# Patient Record
Sex: FEMALE | URBAN - METROPOLITAN AREA
[De-identification: names, ages, dates, MRNs, and addresses within clinical notes are randomized per-mention and may not be internally consistent; named-entity substitution may affect disease eponyms.]

---

## 2020-12-02 ENCOUNTER — OFFICE VISIT (OUTPATIENT)
Dept: URBAN - METROPOLITAN AREA CLINIC 54 | Facility: CLINIC | Age: 60
End: 2020-12-02
Payer: COMMERCIAL

## 2020-12-02 DIAGNOSIS — H31.091 OTHER CHORIORETINAL SCARS, RIGHT EYE: ICD-10-CM

## 2020-12-02 DIAGNOSIS — H35.371 PUCKERING OF MACULA, RIGHT EYE: Primary | ICD-10-CM

## 2020-12-02 DIAGNOSIS — H43.811 VITREOUS DEGENERATION, RIGHT EYE: ICD-10-CM

## 2020-12-02 DIAGNOSIS — Z97.0 PRESENCE OF ARTIFICIAL EYE: ICD-10-CM

## 2020-12-02 PROCEDURE — 92134 CPTRZ OPH DX IMG PST SGM RTA: CPT | Performed by: OPHTHALMOLOGY

## 2020-12-02 PROCEDURE — 92014 COMPRE OPH EXAM EST PT 1/>: CPT | Performed by: OPHTHALMOLOGY

## 2020-12-02 ASSESSMENT — INTRAOCULAR PRESSURE: OD: 23

## 2020-12-02 NOTE — IMPRESSION/PLAN
Impression: Puckering of macula, right eye: H35.371. OD.
OCT OD= ST ERM with good foveal contour  Plan: There is an epiretinal membrane (ERM) in the right eye. We did discuss the natural history as well as the risks and benefits of observation vs. vitrectomy surgery. Given the good foveal contour, and the fact that she is monocular, would recommend observation at this time. The patient will call if they experience decreased vision or increased metamorphopsia. Monocular precautions reviewed. Use AT's. IOP borderline -- obs for now -- may need gtt in future 6 months, OCT/DFE OD (monocular)

## 2020-12-02 NOTE — IMPRESSION/PLAN
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
s/p laser x RT Plan: Stable. No new RT/RD. Observe.

## 2020-12-02 NOTE — IMPRESSION/PLAN
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 Plan: At this time, no retinal tear or retinal detachment is identified. Retinal detachment symptoms were reviewed. Patient was encouraged to call our office if there is an increase in floaters, decrease in vision, or a shadow or curtain is noted in their peripheral vision.

## 2021-06-16 ENCOUNTER — OFFICE VISIT (OUTPATIENT)
Dept: URBAN - METROPOLITAN AREA CLINIC 54 | Facility: CLINIC | Age: 61
End: 2021-06-16
Payer: COMMERCIAL

## 2021-06-16 DIAGNOSIS — H35.411 LATTICE DEGENERATION OF RETINA, RIGHT EYE: ICD-10-CM

## 2021-06-16 PROCEDURE — 99214 OFFICE O/P EST MOD 30 MIN: CPT | Performed by: OPHTHALMOLOGY

## 2021-06-16 PROCEDURE — 92134 CPTRZ OPH DX IMG PST SGM RTA: CPT | Performed by: OPHTHALMOLOGY

## 2021-06-16 ASSESSMENT — INTRAOCULAR PRESSURE: OD: 20

## 2021-06-16 NOTE — IMPRESSION/PLAN
Impression: Puckering of macula, right eye: H35.371. OD.
OCT OD= stable ST ERM with good foveal contour  Plan: There is an epiretinal membrane (ERM) in the right eye. We did discuss the natural history as well as the risks and benefits of observation vs. vitrectomy surgery. Given the good foveal contour, and the fact that she is monocular, would recommend observation at this time. Remains stable on OCT. The patient will call if they experience decreased vision or increased metamorphopsia. Monocular precautions reviewed. Use AT's. IOP high normal -- obs 6 months, OCT/DFE OD (monocular)

## 2022-01-19 ENCOUNTER — OFFICE VISIT (OUTPATIENT)
Dept: URBAN - METROPOLITAN AREA CLINIC 54 | Facility: CLINIC | Age: 62
End: 2022-01-19
Payer: COMMERCIAL

## 2022-01-19 PROCEDURE — 92134 CPTRZ OPH DX IMG PST SGM RTA: CPT | Performed by: OPHTHALMOLOGY

## 2022-01-19 PROCEDURE — 99214 OFFICE O/P EST MOD 30 MIN: CPT | Performed by: OPHTHALMOLOGY

## 2022-01-19 ASSESSMENT — INTRAOCULAR PRESSURE: OD: 13

## 2022-01-19 NOTE — IMPRESSION/PLAN
Impression: Puckering of macula, right eye: H35.371. OD.
OCT OD= stable ST ERM with good foveal contour  Plan: There is an epiretinal membrane (ERM) in the right eye. We did discuss the natural history as well as the risks and benefits of observation vs. vitrectomy surgery. Given the good foveal contour, and the fact that she is monocular, would recommend observation at this time. Remains stable on OCT. The patient will call if they experience decreased vision or increased metamorphopsia. Monocular precautions reviewed. Use AT's. IOP normal - obs 6 months, OCT/DFE OD (monocular)

## 2022-08-10 ENCOUNTER — OFFICE VISIT (OUTPATIENT)
Dept: URBAN - METROPOLITAN AREA CLINIC 54 | Facility: CLINIC | Age: 62
End: 2022-08-10
Payer: COMMERCIAL

## 2022-08-10 DIAGNOSIS — H43.811 VITREOUS DEGENERATION, RIGHT EYE: ICD-10-CM

## 2022-08-10 DIAGNOSIS — H31.091 OTHER CHORIORETINAL SCARS, RIGHT EYE: ICD-10-CM

## 2022-08-10 DIAGNOSIS — Z97.0 PRESENCE OF ARTIFICIAL EYE: ICD-10-CM

## 2022-08-10 DIAGNOSIS — H35.411 LATTICE DEGENERATION OF RETINA, RIGHT EYE: ICD-10-CM

## 2022-08-10 DIAGNOSIS — H35.371 PUCKERING OF MACULA, RIGHT EYE: Primary | ICD-10-CM

## 2022-08-10 PROCEDURE — 99214 OFFICE O/P EST MOD 30 MIN: CPT | Performed by: OPHTHALMOLOGY

## 2022-08-10 PROCEDURE — 92134 CPTRZ OPH DX IMG PST SGM RTA: CPT | Performed by: OPHTHALMOLOGY

## 2022-08-10 ASSESSMENT — INTRAOCULAR PRESSURE: OD: 16

## 2023-03-02 NOTE — IMPRESSION/PLAN
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 Plan: At this time, no retinal tear or retinal detachment is identified. Retinal detachment symptoms were reviewed. Patient was encouraged to call our office if there is an increase in floaters, decrease in vision, or a shadow or curtain is noted in their peripheral vision. Doxycycline Pregnancy And Lactation Text: This medication is Pregnancy Category D and not consider safe during pregnancy. It is also excreted in breast milk but is considered safe for shorter treatment courses.

## 2023-03-02 NOTE — IMPRESSION/PLAN
Impression: Puckering of macula, right eye: H35.371. OD.

OCT: 
OD: ERM 
OS: prosthetic  Plan: Examination and OCT reveals an ERM which is distorting the macular contour. The diagnosis, natural history, and prognosis of epiretinal membranes, as well as the risks and benefits of vitrectomy with membrane peeling versus observation were discussed at length. Given the patient's current visual acuity and minimal hindrance on activities of daily living, observation was recommended at this time. 

RTC 6 months DFE/OCT OU re-eval